# Patient Record
Sex: MALE | Race: WHITE | NOT HISPANIC OR LATINO | URBAN - METROPOLITAN AREA
[De-identification: names, ages, dates, MRNs, and addresses within clinical notes are randomized per-mention and may not be internally consistent; named-entity substitution may affect disease eponyms.]

---

## 2019-01-17 ENCOUNTER — TELEPHONE (OUTPATIENT)
Dept: GASTROENTEROLOGY | Facility: CLINIC | Age: 49
End: 2019-01-17

## 2019-01-17 NOTE — TELEPHONE ENCOUNTER
----- Message from Rosana Hale sent at 1/17/2019  3:25 PM CST -----  Contact: self - [phone]  [place]      Patient Returning Call from [facility]    Who Left Message for Patient: sharyn  Communication Preference:   Additional Information:[phone]

## 2019-01-21 ENCOUNTER — TELEPHONE (OUTPATIENT)
Dept: GASTROENTEROLOGY | Facility: CLINIC | Age: 49
End: 2019-01-21

## 2019-01-21 NOTE — TELEPHONE ENCOUNTER
----- Message from Rosana Hale sent at 1/17/2019  3:25 PM CST -----  Contact: self - [phone]  [place]      Patient Returning Call from [facility]    Who Left Message for Patient: sharyn  Communication Preference:   Additional Information:[phone]

## 2019-01-21 NOTE — TELEPHONE ENCOUNTER
Spoke with patient.  Aware as of today Dr.James Schwab has nothing available any sooner than his April appointment.  He is aware at the time his appointment was scheduled he was put on the wait list.  If something does come available anytime sooner he will be called. Patient expressed understanding.

## 2019-01-25 ENCOUNTER — LAB VISIT (OUTPATIENT)
Dept: LAB | Facility: HOSPITAL | Age: 49
End: 2019-01-25
Attending: INTERNAL MEDICINE
Payer: COMMERCIAL

## 2019-01-25 ENCOUNTER — OFFICE VISIT (OUTPATIENT)
Dept: GASTROENTEROLOGY | Facility: CLINIC | Age: 49
End: 2019-01-25
Payer: COMMERCIAL

## 2019-01-25 VITALS
BODY MASS INDEX: 41.18 KG/M2 | HEART RATE: 69 BPM | SYSTOLIC BLOOD PRESSURE: 125 MMHG | WEIGHT: 294.13 LBS | DIASTOLIC BLOOD PRESSURE: 83 MMHG | HEIGHT: 71 IN

## 2019-01-25 DIAGNOSIS — M62.81 MUSCULAR WEAKNESS: ICD-10-CM

## 2019-01-25 DIAGNOSIS — R68.83 CHILLS: ICD-10-CM

## 2019-01-25 DIAGNOSIS — K52.9 CHRONIC DIARRHEA: Primary | ICD-10-CM

## 2019-01-25 DIAGNOSIS — R52 GENERALIZED BODY ACHES: ICD-10-CM

## 2019-01-25 LAB
CK SERPL-CCNC: 67 U/L
LDH SERPL L TO P-CCNC: 201 U/L

## 2019-01-25 PROCEDURE — 86235 NUCLEAR ANTIGEN ANTIBODY: CPT

## 2019-01-25 PROCEDURE — 99999 PR PBB SHADOW E&M-EST. PATIENT-LVL III: CPT | Mod: PBBFAC,,, | Performed by: INTERNAL MEDICINE

## 2019-01-25 PROCEDURE — 99205 OFFICE O/P NEW HI 60 MIN: CPT | Mod: S$GLB,,, | Performed by: INTERNAL MEDICINE

## 2019-01-25 PROCEDURE — 99999 PR PBB SHADOW E&M-EST. PATIENT-LVL III: ICD-10-PCS | Mod: PBBFAC,,, | Performed by: INTERNAL MEDICINE

## 2019-01-25 PROCEDURE — 86235 NUCLEAR ANTIGEN ANTIBODY: CPT | Mod: 59

## 2019-01-25 PROCEDURE — 36415 COLL VENOUS BLD VENIPUNCTURE: CPT

## 2019-01-25 PROCEDURE — 82550 ASSAY OF CK (CPK): CPT

## 2019-01-25 PROCEDURE — 83615 LACTATE (LD) (LDH) ENZYME: CPT

## 2019-01-25 PROCEDURE — 99205 PR OFFICE/OUTPT VISIT, NEW, LEVL V, 60-74 MIN: ICD-10-PCS | Mod: S$GLB,,, | Performed by: INTERNAL MEDICINE

## 2019-01-25 PROCEDURE — 86255 FLUORESCENT ANTIBODY SCREEN: CPT | Mod: 91

## 2019-01-25 PROCEDURE — 82085 ASSAY OF ALDOLASE: CPT

## 2019-01-25 PROCEDURE — 3008F PR BODY MASS INDEX (BMI) DOCUMENTED: ICD-10-PCS | Mod: CPTII,S$GLB,, | Performed by: INTERNAL MEDICINE

## 2019-01-25 PROCEDURE — 86038 ANTINUCLEAR ANTIBODIES: CPT

## 2019-01-25 PROCEDURE — 3008F BODY MASS INDEX DOCD: CPT | Mod: CPTII,S$GLB,, | Performed by: INTERNAL MEDICINE

## 2019-01-25 NOTE — PROGRESS NOTES
[facility] Consultation Note    Reason for Consult:    Chief Complaint   Patient presents with    GI Problem     2nd opinion       PCP:   Delonte Parker    Referring MD:  No referring provider defined for this encounter.    HPI:  Lara Turner is a 48 y.o. male here for a second opinion regarding abdominal pain and recurrent diarrhea.  He has a complicated case and has had several tests done.  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  This has been an intermittent problem for a couple of year.  He reports doing well after gallbladder surgery, but symptoms returned in 2017.  Subsequently, his appendix was removed, but without resolution.  He has recurrent episodes of weakness, fatigue, and intermittent chills.  Symptoms last up to a week at a time and there may be a couple of weeks between episodes.  Episodes seem to be coming more frequently.  He gets a strange sensation around his eyes that he cannot explain, but doesn't affect his vision.  His eyes turn red.  He has aches in the shoulders and legs, and cramps in his left arm.  He feels unsteady on his feet and finds it difficult to get up from a seated position during episodes.  He denies dizziness.  He denies rashes.  He feels fine between episodes.  He had nausea in the past, but not now.  He has chronic diarrhea ever since his gallbladder surgery.  Stools are loose, 5 times per day with urgency after eating.      He was seen by a local GI doctor who performed an EGD showing gastritis.  A gastric emptying study was positive for 30% retention.  He was given medication for this, but hasn't taken it.  In fact, he stopped most of his medications.  Courses of antibiotics helped some.  He was hospitalized on observation in December to have testing done.  I have results of some of those tests, but not everything.  He reports that MRI of the brain and CT chest/abdomen/pelvis were done in the past.  He was recently  "seen by an allergist - no report or test results available.    Colonoscopy last month was negative for cause of diarrhea with normal random colon biopsies.  Fecal elastase was normal.  Mononucleosis testing negative.  HIV negative.  West Nile virus negative.  Tryptase normal.  Cortisol stimulation test normal.  He reports having submitted a 24-hour urine sample for porphyrins, but no results yet.            ROS:  Constitutional: No fevers but positive for intermittent chills, No weight loss (has actually gained 13 lbs), normal appetite and no problems eating  ENT: No congestion, rhinorrhea, or chronic sinus problems  CV: No chest pain or palpitations  Pulm: No cough, No shortness of breath  Ophtho: No vision changes or pain  GI: see HPI  Derm: No rash or lesions  Heme: No lymphadenopathy, No bruising  MSK: No arthritis or joint swelling  : No dysuria, No frequent urination  Endo: No hot or cold intolerance  Neuro: No syncope, No seizure      Medical History:  has a past medical history of Appendicitis, Cholelithiasis, Chronic diarrhea, and Colon polyp.    Surgical History:  has a past surgical history that includes Cholecystectomy; Appendectomy; Colonoscopy; and Upper gastrointestinal endoscopy.    Family History: family history is not on file.    Social History:  reports that  has never smoked. he has never used smokeless tobacco. He reports that he does not drink alcohol or use drugs.    Review of patient's allergies indicates:   Allergen Reactions    Penicillins Rash       Prior to Admission medications    Not on File       Objective Findings:  Vital Signs:  /83   Pulse 69   Ht 5' 10.5" (1.791 m)   Wt 133.4 kg (294 lb 1.5 oz)   BMI 41.60 kg/m²   Body mass index is 41.6 kg/m².    Physical Exam:  General Appearance:  Well appearing in no acute distress, appears stated age  Head:  Normocephalic, atraumatic  Eyes:  No scleral icterus or pallor, EOMI; eyes are red  ENT:  Neck supple, moist mucosa; OP " clear  Lungs:  CTA bilaterally in anterior and posterior fields, no wheezes, no crackles; no dullness  Heart:  Regular rate and rhythm, S1, S2 normal, no murmurs heard  Abdomen:  Soft, non-tender, non distended with positive bowel sounds in all four quadrants. No hepatosplenomegaly, ascites, or mass  Extremities:  No clubbing, cyanosis, or edema; no tenderness with palpation  Skin:  No rash  Neurologic:  AAO x 4; CN II-XII intact, no muscular weakness in upper or lower extremeties      Labs:  Outside labs reviewed as per HPI        Imaging:  Outside imaging reports reviewed as per HPI              Assessment:  Lara Turner is a 48 y.o. male with:  1. Chronic diarrhea    2. Generalized body aches    3. Muscular weakness    4. Chills      His chronic diarrhea is likely secondary to cholecystectomy as this has been relatively unchanged during the last few year.  These intermittent episodes seem to involve non-GI systems.  I am curious if this could be related to an autoimmune or neuromuscular disease.  He had evidence of gastroparesis, but this was likely transient as those symptoms have resolved and he had no problems with eating now.  I do not have all records at this time from his hospital stay or from the allergist.          Recommendations/Plan:  1. I will check the below labs for autoimmune or muscular disease  2. I will check the stool for inflammation, fats, and parasites.  I will also check for C difficile.    3. Obtain records from hospital stay (fax [phone]) and from the allergist  4. Consider referral to rheumatology    Follow-up pending the above      Order summary:  Orders Placed This Encounter    Clostridium difficile EIA    TILA Screen w/Reflex    CK    ALDOLASE    LACTATE DEHYDROGENASE    ANTI -SSA ANTIBODY    ANTI SM/RNP ANTIBODY    ANTI- CHARANJIT-1 ANTIBODY    ANTI-NEUTROPHILIC CYTOPLASMIC ANTIBODY    ANTI-SSB ANTIBODY    Stool Exam-Ova,Cysts,Parasites    Giardia / Cryptosporidum, EIA     Fecal fat, qualitative    Lactoferrin, fecal, quantitative    Calprotectin         Thank you so much for allowing me to participate in the care of Lara Cruz MD

## 2019-01-27 ENCOUNTER — PATIENT MESSAGE (OUTPATIENT)
Dept: GASTROENTEROLOGY | Facility: CLINIC | Age: 49
End: 2019-01-27

## 2019-01-28 LAB
ALDOLASE SERPL-CCNC: 5.2 U/L
ANA SER QL IF: NORMAL
ANCA AB TITR SER IF: NORMAL TITER
ANTI SM/RNP ANTIBODY: 3.01 EU
ANTI-SM/RNP INTERPRETATION: NEGATIVE
ANTI-SSA ANTIBODY: 2.74 EU
ANTI-SSA INTERPRETATION: NEGATIVE
ANTI-SSB ANTIBODY: 1.23 EU
ANTI-SSB INTERPRETATION: NEGATIVE
P-ANCA TITR SER IF: NORMAL TITER

## 2019-01-30 ENCOUNTER — PATIENT MESSAGE (OUTPATIENT)
Dept: GASTROENTEROLOGY | Facility: CLINIC | Age: 49
End: 2019-01-30

## 2019-02-04 ENCOUNTER — TELEPHONE (OUTPATIENT)
Dept: GASTROENTEROLOGY | Facility: CLINIC | Age: 49
End: 2019-02-04

## 2019-02-04 LAB — ENA JO1 AB SER IA-ACNC: <1 INDEX

## 2019-02-04 NOTE — TELEPHONE ENCOUNTER
----- Message from Eyal Chow MA sent at 2/4/2019 12:02 PM CST -----  Contact: Pt   Pt called and would like a call back from the nurse regarding the results of his test.          Pt can be reached at [phone].      Thanks

## 2019-02-05 NOTE — TELEPHONE ENCOUNTER
Spoke with the patient by phone.  Reviewed results.  All tests were normal.  He had symptoms return and currently with arm heaviness and chills.  He took Lomotil after seeing me and his diarrhea has resolved - he is having formed stools.  He denies nausea, vomiting, or abdominal pain associated with eating.  I do not feel that he has gastroparesis and these symptoms are not characteristic of gastroparesis.      At this time, I do not feel that his current symptoms are indicative of a primary GI problem.      He has made an appointment with rheumatology for this Wednesday to get their opinion.

## 2019-02-06 ENCOUNTER — LAB VISIT (OUTPATIENT)
Dept: LAB | Facility: HOSPITAL | Age: 49
End: 2019-02-06
Attending: INTERNAL MEDICINE
Payer: COMMERCIAL

## 2019-02-06 ENCOUNTER — OFFICE VISIT (OUTPATIENT)
Dept: RHEUMATOLOGY | Facility: CLINIC | Age: 49
End: 2019-02-06
Payer: COMMERCIAL

## 2019-02-06 VITALS
HEIGHT: 70 IN | DIASTOLIC BLOOD PRESSURE: 80 MMHG | SYSTOLIC BLOOD PRESSURE: 131 MMHG | HEART RATE: 80 BPM | BODY MASS INDEX: 43.65 KG/M2 | WEIGHT: 304.88 LBS

## 2019-02-06 DIAGNOSIS — Z55.9 EDUCATIONAL CIRCUMSTANCE: ICD-10-CM

## 2019-02-06 DIAGNOSIS — R19.7 DIARRHEA, UNSPECIFIED TYPE: ICD-10-CM

## 2019-02-06 DIAGNOSIS — M48.10 DISH (DIFFUSE IDIOPATHIC SKELETAL HYPEROSTOSIS): ICD-10-CM

## 2019-02-06 DIAGNOSIS — E66.01 MORBID OBESITY WITH BMI OF 40.0-44.9, ADULT: ICD-10-CM

## 2019-02-06 DIAGNOSIS — R53.83 FATIGUE, UNSPECIFIED TYPE: ICD-10-CM

## 2019-02-06 DIAGNOSIS — M25.50 PAIN IN JOINT INVOLVING MULTIPLE SITES: Primary | ICD-10-CM

## 2019-02-06 DIAGNOSIS — E55.9 VITAMIN D INSUFFICIENCY: ICD-10-CM

## 2019-02-06 LAB
25(OH)D3+25(OH)D2 SERPL-MCNC: 17 NG/ML
IGA SERPL-MCNC: 367 MG/DL

## 2019-02-06 PROCEDURE — 82784 ASSAY IGA/IGD/IGG/IGM EACH: CPT

## 2019-02-06 PROCEDURE — 82306 VITAMIN D 25 HYDROXY: CPT

## 2019-02-06 PROCEDURE — 99245 PR OFFICE CONSULTATION,LEVEL V: ICD-10-PCS | Mod: S$GLB,,, | Performed by: INTERNAL MEDICINE

## 2019-02-06 PROCEDURE — 36415 COLL VENOUS BLD VENIPUNCTURE: CPT

## 2019-02-06 PROCEDURE — 83516 IMMUNOASSAY NONANTIBODY: CPT

## 2019-02-06 PROCEDURE — 99999 PR PBB SHADOW E&M-EST. PATIENT-LVL III: ICD-10-PCS | Mod: PBBFAC,,, | Performed by: INTERNAL MEDICINE

## 2019-02-06 PROCEDURE — 99999 PR PBB SHADOW E&M-EST. PATIENT-LVL III: CPT | Mod: PBBFAC,,, | Performed by: INTERNAL MEDICINE

## 2019-02-06 PROCEDURE — 99245 OFF/OP CONSLTJ NEW/EST HI 55: CPT | Mod: S$GLB,,, | Performed by: INTERNAL MEDICINE

## 2019-02-06 RX ORDER — ASPIRIN 325 MG
50000 TABLET, DELAYED RELEASE (ENTERIC COATED) ORAL
Qty: 40 CAPSULE | Refills: 0 | Status: SHIPPED | OUTPATIENT
Start: 2019-02-07

## 2019-02-06 SDOH — SOCIAL DETERMINANTS OF HEALTH (SDOH): PROBLEMS RELATED TO EDUCATION AND LITERACY, UNSPECIFIED: Z55.9

## 2019-02-06 ASSESSMENT — ROUTINE ASSESSMENT OF PATIENT INDEX DATA (RAPID3)
FATIGUE SCORE: 8.5
MDHAQ FUNCTION SCORE: 2
PATIENT GLOBAL ASSESSMENT SCORE: 4.5
AM STIFFNESS SCORE: 1, YES
WHEN YOU AWAKENED IN THE MORNING OVER THE LAST WEEK, PLEASE INDICATE THE AMOUNT OF TIME IT TAKES UNTIL YOU ARE AS LIMBER AS YOU WILL BE FOR THE DAY: 10MIN
PAIN SCORE: 1
TOTAL RAPID3 SCORE: 4.05
PSYCHOLOGICAL DISTRESS SCORE: 0

## 2019-02-06 NOTE — PROGRESS NOTES
Subjective:     Patient ID: Lara Turner is a 49 y.o. male sent by Dr. Cruz in GI for multiple myalgia associated with fatigue.     Chief Complaint: No chief complaint on file.     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 He feels like he has the flu--but doesn't. Feels like he has fever but temperature when taken is normal. Sometimes he looks very pale and his eyes get red. 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  Has bouts of feeling bad all over, last for days.  He states he sleeps well with CPAP. His fatigue begins shortly after he gets up. He does not feel like doing anything. He denies joint pains per se (has had plantar fasciitis especially on the left in the past) and denies joint swelling and AM stiffness.  He had taken ibuprofen and it may have helped for a few days but then no longer helped. Aleve didn't help. Hot showers feel good but do not have long lasting benefit. He denies dysphagia, tight skin, oral ulcers, sicca symptoms, pleurisy, pericarditis, photosensitivity, skin rashes, psoriasis, thromboses.    Was playing basketball until 2017 but stopped due to L knee pain.    Has an orthopedic surgeon who has worked him up with imaging and told him he had age appropriate OA.  Was told he had diffuse spondylosis pf TSpine by imaging.              No current outpatient medications on file.     No current facility-administered medications for this visit.        Review of patient's allergies indicates:   Allergen Reactions    Penicillins Rash       Review of Systems   Constitutional: Positive for chills, fatigue and fever.   HENT: Negative.  Negative for hearing loss, mouth sores, sore throat, tinnitus and trouble swallowing.    Eyes: Positive for redness. Negative for visual disturbance.   Respiratory: Negative.   "Negative for cough, choking, chest tightness and shortness of breath.    Cardiovascular: Negative.  Negative for chest pain, palpitations and leg swelling.   Gastrointestinal: Positive for diarrhea. Negative for abdominal pain, blood in stool, constipation, nausea and vomiting.   Genitourinary: Negative.  Negative for frequency, hematuria and urgency.   Musculoskeletal: Positive for arthralgias, back pain (thoracic), myalgias and neck stiffness. Negative for neck pain.   Skin: Negative.  Negative for rash.   Neurological: Positive for weakness. Negative for dizziness, syncope, numbness and headaches.   Hematological: Does not bruise/bleed easily.   Psychiatric/Behavioral: Positive for agitation and decreased concentration. Negative for dysphoric mood and sleep disturbance (on CPAP machine). The patient is not nervous/anxious.        Past Medical History:   Diagnosis Date    Appendicitis     10/2017    Cholelithiasis     Chronic diarrhea     Colon polyp        Past Surgical History:   Procedure Laterality Date    APPENDECTOMY      CHOLECYSTECTOMY      COLONOSCOPY      UPPER GASTROINTESTINAL ENDOSCOPY       FAMILY HX   F:81 pacemaker  M: hypothyroidism; TKR;  1 B: MI, DM, HBP;   1 B: joint issues from work;   3 children A&W one takes meds for migraines;     Social History     Tobacco Use    Smoking status: Never Smoker    Smokeless tobacco: Never Used   Substance Use Topics    Alcohol use: No     Frequency: Never    Drug use: No   Happily . Dentist.     Objective:   /80   Pulse 80   Ht 5' 10" (1.778 m)   Wt (!) 138.3 kg (304 lb 14.3 oz)   BMI 43.75 kg/m²     Accompanied by his wife.   Physical Exam   Vitals reviewed.  Constitutional: He is oriented to person, place, and time and well-developed, well-nourished, and in no distress. No distress.   HENT:   Head: Normocephalic and atraumatic.   Mouth/Throat: Oropharynx is clear and moist. No oropharyngeal exudate.   No facial rashes  Parotids " not enlarged  No oral ulcers   Eyes: Conjunctivae and EOM are normal. Pupils are equal, round, and reactive to light. Right eye exhibits no discharge. Left eye exhibits no discharge. No scleral icterus.   Neck: Neck supple. No JVD present. No tracheal deviation present. No thyromegaly present.   Cardiovascular: Normal rate, regular rhythm, normal heart sounds and intact distal pulses.  Exam reveals no gallop and no friction rub.    No murmur heard.  Pulmonary/Chest: Effort normal and breath sounds normal. No respiratory distress. He has no wheezes. He has no rales. He exhibits no tenderness.   Abdominal: Soft. Bowel sounds are normal. He exhibits no distension and no mass. There is no splenomegaly or hepatomegaly. There is no tenderness. There is no rebound and no guarding.   Lymphadenopathy:     He has no cervical adenopathy.        Right: No inguinal adenopathy present.        Left: No inguinal adenopathy present.   Neurological: He is alert and oriented to person, place, and time. He has normal reflexes. No cranial nerve deficit. Gait normal.   Proximal and distal muscle strength 5/5.   Skin: Skin is warm and dry. No rash noted. He is not diaphoretic.     Psychiatric: Mood, memory, affect and judgment normal.   Musculoskeletal: Normal range of motion. He exhibits no edema or tenderness.   Cspine FROM no tenderness  Tspine FROM no tenderness  Lspine FROM no tenderness.  TMJ: unremarkable  Shoulders: FROM; no synovitis;  Elbows: FROM; no synovitis; no tophi or nodules  Wrists: FROM; no synovitis;    MCPs: FROM; no synovitis; no metacarpalgia;  ok;  PIPs:FROM; no synovitis;   DIPs: FROM; no synovitis;   HIPS: FROM  Knees: FROM; no synovitis; no instability;  Ankles: FROM: no synovitis   Toes: ok;               1/25/19: TILA /SSA neg; ANCAs neg ;Jo1 neg; CPK 67; aldolase 5.2  1/18/19: CBC ok  The following were normal or negative: CBC, CMP; TSH; TIAL profile, Scl-70;  EBV titers;     4/20/18: CXR: personally viewed  on patient's phone: lung parenchyma ok; has DISH    Assessment:   Joint/muscle pain multiple sites associated with fatigue   C/w CSS/fibromyalgia type illness in men   R/O subclinical depression   Diarrhea  Diffuse idiopathic skeletal hyperostosis by imaging  Morbid obesity  Plan:   Celiac antibodies.  Vitamin D.  The patient was educated on fibromyalgia.  Symptoms/presentations, non-pharmacologic and pharmacologic treatments and their efficacies were reviewed.   Non-pharmacologic approaches were stressed.  ExPRESS was reviewed in detail.  Regular/daily exercise was especially emphasized. Strategies for success were offered.  Cognitive behavioral therapy is very helpful.  Pharmacologic treatments  were reviewed.  An empiric trial of duloxetine may be beneficial .   Patient was provided with written information and referred to a several websites for further information.      CC:   MD Dleonte Skinner MD    Addendum: 2/6/19: vit D 17 (rx sent); IgA  367 (350); Tissue transglutaminase IgA pending.

## 2019-02-06 NOTE — PATIENT INSTRUCTIONS
Read on fibromyalgia.      Daily, aerobic, low impact, dedicated exercise.    Relaxation techniques.    Discuss the use of duloxetine with Dr. Parker.

## 2019-02-08 LAB — TTG IGA SER-ACNC: 11 UNITS

## 2019-02-18 ENCOUNTER — PATIENT MESSAGE (OUTPATIENT)
Dept: RHEUMATOLOGY | Facility: CLINIC | Age: 49
End: 2019-02-18

## 2019-03-12 ENCOUNTER — PATIENT MESSAGE (OUTPATIENT)
Dept: RHEUMATOLOGY | Facility: CLINIC | Age: 49
End: 2019-03-12